# Patient Record
Sex: FEMALE | Race: WHITE
[De-identification: names, ages, dates, MRNs, and addresses within clinical notes are randomized per-mention and may not be internally consistent; named-entity substitution may affect disease eponyms.]

---

## 2019-02-13 ENCOUNTER — HOSPITAL ENCOUNTER (OUTPATIENT)
Dept: HOSPITAL 80 - FSGY | Age: 44
Discharge: HOME | End: 2019-02-13
Attending: OTOLARYNGOLOGY
Payer: COMMERCIAL

## 2019-02-13 VITALS — DIASTOLIC BLOOD PRESSURE: 79 MMHG | SYSTOLIC BLOOD PRESSURE: 123 MMHG

## 2019-02-13 DIAGNOSIS — J32.3: ICD-10-CM

## 2019-02-13 DIAGNOSIS — J34.89: ICD-10-CM

## 2019-02-13 DIAGNOSIS — J32.2: Primary | ICD-10-CM

## 2019-02-13 DIAGNOSIS — Z86.018: ICD-10-CM

## 2019-02-13 DIAGNOSIS — J33.8: ICD-10-CM

## 2019-02-13 DIAGNOSIS — J34.2: ICD-10-CM

## 2019-02-13 NOTE — POSTOPPROG
Post Op Note


Date of Operation: 02/13/19


Surgeon: Mary Lou Argueta


Anesthesiologist: Joan


Anesthesia: GET(General Endotracheal)


Pre-op Diagnosis: CRSwNP, prev transethmoid approach to the pituitary


Post-op Diagnosis: same


Procedure: B FESS, SMRT, Stealth


Findings: distorted anatomy R side, prev surgery, polyps in eth and sph B, 

thick debr


Inf/Abcess present in the surg proc area at time of surgery?: No


EBL: 


Complications: 





none

## 2019-02-13 NOTE — PDANEPAE
ANE Past Medical History





- Cardiovascular History


Hx Hypertension: No


Hx Arrhythmias: No


Hx Chest Pain: No


Hx Coronary Artery / Peripheral Vascular Disease: No


Hx CHF / Valvular Disease: No


Hx Palpitations: No





- Pulmonary History


Hx COPD: No


Hx Asthma/Reactive Airway Disease: No


Hx Recent Upper Respiratory Infection: No


Hx Oxygen in Use at Home: No


Hx Sleep Apnea: No


Sleep Apnea Screening Result - Last Documented: Negative





- Neurologic History


Hx Cerebrovascular Accident: No


Hx Seizures: No


Hx Dementia: No





- Endocrine History


Hx Diabetes: No





- Renal History


Hx Renal Disorders: No





- Liver History


Hx Hepatic Disorders: No





- Neurological & Psychiatric Hx


Hx Neurological and Psychiatric Disorders: No





- Cancer History


Hx Cancer: No





- Congenital Disorder History


Hx Congenital Disorders: No





- GI History


Hx Gastrointestinal Disorders: No





- Other Health History


Other Health History: wears glasses.  reproductive issues- currently on ivf 

regime.  hx of transphenoidal pituitary tumor





- Chronic Pain History


Chronic Pain: No





- Surgical History


Prior Surgeries: lap procedure for endometriosis 15 yrs ago.  03/2018 

myomectomy for fibroid tumors.  05/2018 transphenoidal pituitary tumor removed





ANE Review of Systems


Review of Systems: 








- Exercise capacity


METS (RN): 6 METS





ANE Patient History





- Allergies


Allergies/Adverse Reactions: 








fentanyl Allergy (Verified 02/13/19 08:06)


 Vomiting after being on a few hours








- Home Medications


Home Medications: 








Herbals/Supplements -Info Only  02/07/19 [Last Taken Unknown]


Ivf Protocol On Depolupron Qmo  02/07/19 [Last Taken Unknown]


Levothyroxine  02/07/19 [Last Taken Unknown]


Vit D Once A Week   02/07/19 [Last Taken Unknown]








- NPO status


NPO Since - Liquids (Date): 02/12/19


NPO Since - Liquids (Time): 19:00


NPO Since - Solids (Date): 02/13/19


NPO Since - Solids (Time): 04:00





- Smoking Hx


Smoking Status: Never smoked





- Family Anes Hx


Family Hx Anesthesia Complications: none





ANE Labs/Vital Signs





- Vital Signs


Vital Signs: reviewed preoperatively; see RN documention for details


Blood Pressure: 122/78


Heart Rate: 81


Respiratory Rate: 16


O2 Sat (%): 96


Height: 175.26 cm


Weight: 72.575 kg





ANE Physical Exam





- Airway


Neck exam: FROM


Mallampati Score: Class 3


Mouth exam: normal dental/mouth exam





- Pulmonary


Pulmonary: clear to auscultation





- Cardiovascular


Cardiovascular: regular rate and rhythym





- ASA Status


ASA Status: II





ANE Anesthesia Plan


Anesthesia Plan: general endotracheal anesthesia


Total IV Anesthesia: Yes

## 2019-02-13 NOTE — PDGENHP
History & Physical


Chief Complaint: CRSwNP


History of Present Illness: Pt s/p Transnasal transethmoid approach to a 

pituitary mass 2 yrs ago.  For past 8 mos has had nasal obstruction, thick 

nasal d/c and smell dysfunction. On endoscopy noted polypoid thickening and 

thick tenacious debris within sphenoid. Have tx with abx and steroids x 2, no 

imp, Ct showed CRS in ethmoids B and sphenoids B as well as R frontal 

thickening.  Plan for B revision FESS


Pertinent Past, Social, Family History: h/o pituitary macroadenoma


Relevant Physical Exam: CRSwNP on R


Cardiorespiratory Assessment: normal rate and rhythm.  No resp distress

## 2019-02-19 NOTE — GOP
[f rep st]



                                                                OPERATIVE REPORT





DATE OF OPERATION:  02/13/2019



SURGEON:  Mary Lou Argueta MD



ANESTHESIA:  General.



PREOPERATIVE DIAGNOSIS:  

1.  Chronic sphenoid sinusitis.

2.  Chronic ethmoid sinusitis.

3.  Two years status post a trans-ethmoid approach on the right to a pituitary microadenoma.

4.  Nasal polyposis.



POSTOPERATIVE DIAGNOSIS:  

1.  Chronic sphenoid sinusitis.

2.  Chronic ethmoid sinusitis.

3.  Two years status post a trans-ethmoid approach on the right to a pituitary microadenoma.

4.  Nasal polyposis.



PROCEDURE PERFORMED:  

1.  Endoscopically assisted septoplasty.

2.  Right revision endoscopic maxillary antrostomy.

3.  Left endoscopic maxillary antrostomy.

4.  Bilateral total sphenoid ethmoidectomies revision with tissue removal bilaterally.

5.  Submucous resection of the inferior turbinates bilaterally.

6.  Stereotactic volumetric navigation of the paranasal sinuses and extradural skull base utilizing t
he [employer] Stealth system.



FINDINGS:  The patient was found to have evidence of a prior middle turbinectomy on the right, as wel
l as a maxillary antrostomy and partial ethmoidectomy. She had widely patent sphenoidotomies bilatera
lly. She had very significant thick mucinous debris within the sphenoid that was very difficult to re
move, as well as a deviated septum to the left, requiring a septoplasty.





ESTIMATED BLOOD LOSS:  Minimal.



INDICATIONS:  The patient is a very pleasant 43-year-old woman who has a history of a pituitary micro
adenoma that was found on a workup for infertility. She had surgery for this done at outside [facility] about 2 years ago. For about the past 9 months, she has felt nasal obstruction at the nasal drain
age, and postnasal drip, as well as a foul odor in her nose. She was initially seen and had an acute 
sinusitis and was given antibiotics, as well as some steroids. On nasal endoscopy, she had significan
t thick mucinous debris that was visualized within the sphenoid sinus, although this was somewhat obs
tructed, secondary to significant polyposis and inflammation in this area on both sides. I really cou
ld not see anything on the left, but I could on the right. She was treated with steroids x2 and did n
ot have any significant improvement. She also did budesonide rinses, and after no improvement with al
l these, a CT scan was done, which showed posterior ethmoid opacification bilaterally, as well as sph
enoid opacification on both sides, and evidence of prior surgery on the right. It is felt she would b
enefit from the above procedure.



DESCRIPTION OF PROCEDURE:  The patient was first seen in the preoperative area where informed consent
 was obtained. She was then brought back to operating room where Anesthesia sedated and intubated her
. The bed was turned 90 degrees and she was prepped and draped in a normal fashion. Epinephrine soake
d pledgets were placed within the nares bilaterally, and then, the Stealth system was placed on the f
orehead, and then the pledgets were removed. She was registered and confirmed to be tracking accurate
ly. A universal time-out protocol was performed, and once we had confirmed the patient and procedure,
 we started the procedure. 



The 0-degree scope was first used to visualize the right side of the nose. She had evidence of a midd
le turbinectomy, some scarring around the maxillary antrostomy, as well as a significant polyposis po
steriorly, and some very thick debris was within the ethmoid bed, as well as sphenoid. I did try to s
uction out as much as possible from the sphenoid. This was very tenacious and difficult to remove in 
this fashion. At this point, the microdebrider was used to remove some of the polyps overlying the ma
xillary antrostomy, as well as in the ethmoid bed. 



Once this was done, epinephrine soaked pledgets were placed on this side, and then, the 0-degree scop
e was used to evaluate the left side. It was felt she needed an opening of the maxillary and ethmoids
 on this side as well to completely open the sphenoid in the back and access this region, so at this 
point, about 5 cc of 1% lidocaine with 1:100,000 epinephrine was injected into the septum on both lang
es. Once this had sufficient time to act, a left-sided modified Fredo incision was made on the left
, and then, a caudal was used to elevate a mucoperiosteal flap posterior to the area of obstruction. 
The 0-degree scope was used within the incision to help with elevation of the flap. Once this was don
e, the cartilage was divided inferiorly to superiorly with a caudal, and then, the mucoperiosteal fla
p was elevated on the right side isolating the cartilage and bone, and then, multiple hand instrument
s were used to remove the cartilage and bone until I was able to visualize the middle meatus and acce
ss the middle meatus well on the left side. I could see to get back to the posterior aspect of the se
ptum where the previous posterior septectomy had been done, and at least 1 cm caudal and dorsal strut
 were left. Once this was done, all instruments were removed from the incision, and then, a 5-0 plain
 gut was used on a Jethro needle to place quilting sutures through and through. 



At this point, I turned my attention back to the right side. Using navigation, I localized the lamina
, as well as the natural os of the maxillary antrostomy. It looked like she still had a little bit of
 uncinate, as well as some very thick posterior fontanelle and bone. This was taken down and widened 
using hand instruments, as well as the microdebrider until  she had a widely patent maxillary sinus. 




I then proceeded through the anterior and posterior ethmoids to get to the sphenoid. Once I had the s
phenoid more accessible, I had removed the inferior aspect of the superior turbinate, as well as some
 posterior ethmoid partitions.  She already had a very wide sphenoidotomy and so no more bone was loan
en down. Some of the scarring was removed, as well as the polyps were taken down until I could see al
l aspects of this. Once this was done, I could visualize the very wide sphenoid that was filled with 
mucinous debris. Very large suctions were used to try to suction this clear and I could not get it, s
o at this point, a suction attached to a 60 cc syringe was used to irrigate this gently on the right 
side releasing some of this area. Once I was able to do this a couple of times, this released the clementine
ris enough to where I was able to remove the majority of it from the right and left side, although sh
e still had some on the left. Under this, the mucosa was smooth and not overly edematous. At this poi
nt, I continued to complete the ethmoidectomy from posterior to anterior along the skull base utilizi
ng the [employer] Fusion system. I was able to visualize the frontal recess. I initially thought that 
a frontal sinusotomy would be necessary, but on visualization, I was able to visualize the entire fro
ntal sinus, as well as probe my fusion guided suction in this area. It was felt this did not need to 
be done. 



At this point, I turned my attention to the left side. I performed an uncinectomy using the microdebr
ider and a back biter, and then, I was able to visualize the natural os with the 70-degree scope. The
 Fusion guided suction was used to confirm this and this was placed in the natural os, and then, the 
posterior fontanelle was pushed posteriorly and medially. Hand instruments, as well as microdebrider 
were used to complete the antrostomy. I then went back to the 0-degree scope and a sinus seeker was p
laced in the retrobulla recess and this was fractured forward. Hand instruments, as well as microdebr
ider were used to complete the anterior ethmoidectomy, and then, Fusion guided suction was used to ma
ke a puncture hole in the basal lamella and inferomedial aspect of it, and then, a J curette was used
 to fracture some of the posterior ethmoid cells anteriorly and laterally. Hand instruments, as well 
as the microdebrider were used to clean this area up and complete the ethmoidectomy. Once this was do
ne, I could see the sphenoid portion of the superior turbinate on this left side was removed as well,
 and then, an up-and-down biting [place] were used to remove some ethmoid partitions along the garo
a, as well as superiorly along the skull base. Once this was done, the same type of saline irrigation
 was used in this left sphenoid to loosen up the mucinous debris. This was completely released and reynoso
ctioned clear. The entire sphenoid was opened and removed the debris, and the bilateral maxillary and
 ethmoids were completely opened as well. 



Once this was done, I used a 2 mm turbinate blade to make a small stab incision in the head of the in
ferior turbinate on the left, and then, a submucous resection was performed along the length of this 
on the left, and then, I did the exact same thing on the right. Once this was done, a [place] was used 
to first in-fracture and then outfracture the inferior turbinates bilaterally, and she had significan
tly more patency on both sides. 



The nasal cavities were irrigated out copiously with normal saline and then suctioned clear. Rivas sp
lints that had been cut to size were then placed on either side of the septum and sutured through and
 through with a 3-0 Prolene to the caudal portion of the septum. Some epinephrine soaked pledgets wer
e placed within the nares and tied together until the patient was extubated, and then, these were rem
kelvin in the PACU. The patient was then woken and taken to PACU in stable condition. She tolerated the
 procedure well. There were no complications, and all pledget counts were correct at the end the proc
edure.



COMPLICATIONS:  None.





Job #:  [id number]

## 2022-12-08 NOTE — POSTANESTH
Post Anesthetic Evaluation


Cardiovascular Status: Normal, Stable


Respiratory Status: Normal, Stable


Level of Consciousness/Mental Status: Can Participate in Eval, Moderately Sleepy


Pain Control: Adequate, Prn Tx Ordered


Nausea/Vomiting Control: Adequate, Prn Tx Ordered


Complications Possibly Related to Anesthesia: None Noted 08-Dec-2022 12:52